# Patient Record
Sex: FEMALE | Race: WHITE | NOT HISPANIC OR LATINO | Employment: UNEMPLOYED | URBAN - METROPOLITAN AREA
[De-identification: names, ages, dates, MRNs, and addresses within clinical notes are randomized per-mention and may not be internally consistent; named-entity substitution may affect disease eponyms.]

---

## 2017-03-02 RX ORDER — IRBESARTAN 300 MG/1
TABLET ORAL
Qty: 90 TABLET | Refills: 0 | OUTPATIENT
Start: 2017-03-02

## 2017-11-15 ENCOUNTER — HOSPITAL ENCOUNTER (OUTPATIENT)
Facility: OTHER | Age: 82
Discharge: HOME | End: 2017-11-15
Attending: ANESTHESIOLOGY | Admitting: ANESTHESIOLOGY
Payer: MEDICARE

## 2017-11-15 VITALS
WEIGHT: 145 LBS | OXYGEN SATURATION: 95 % | HEART RATE: 71 BPM | RESPIRATION RATE: 18 BRPM | TEMPERATURE: 99 F | DIASTOLIC BLOOD PRESSURE: 70 MMHG | SYSTOLIC BLOOD PRESSURE: 151 MMHG | BODY MASS INDEX: 24.16 KG/M2 | HEIGHT: 65 IN

## 2017-11-15 DIAGNOSIS — M47.817 LUMBOSACRAL SPONDYLOSIS: ICD-10-CM

## 2017-11-15 DIAGNOSIS — M47.817 LUMBOSACRAL SPONDYLOSIS WITHOUT MYELOPATHY: Primary | ICD-10-CM

## 2017-11-15 PROCEDURE — 82947 ASSAY GLUCOSE BLOOD QUANT: CPT | Performed by: ANESTHESIOLOGY

## 2017-11-15 PROCEDURE — S0020 INJECTION, BUPIVICAINE HYDRO: HCPCS | Performed by: ANESTHESIOLOGY

## 2017-11-15 PROCEDURE — 64494 INJ PARAVERT F JNT L/S 2 LEV: CPT | Mod: 50 | Performed by: ANESTHESIOLOGY

## 2017-11-15 PROCEDURE — 64495 INJ PARAVERT F JNT L/S 3 LEV: CPT | Mod: 50 | Performed by: ANESTHESIOLOGY

## 2017-11-15 PROCEDURE — 25000003 PHARM REV CODE 250: Performed by: ANESTHESIOLOGY

## 2017-11-15 PROCEDURE — 64493 INJ PARAVERT F JNT L/S 1 LEV: CPT | Mod: 50 | Performed by: ANESTHESIOLOGY

## 2017-11-15 PROCEDURE — 63600175 PHARM REV CODE 636 W HCPCS: Performed by: ANESTHESIOLOGY

## 2017-11-15 RX ORDER — ALPRAZOLAM 0.5 MG/1
0.5 TABLET, ORALLY DISINTEGRATING ORAL ONCE AS NEEDED
Status: COMPLETED | OUTPATIENT
Start: 2017-11-15 | End: 2017-11-15

## 2017-11-15 RX ORDER — LIDOCAINE HYDROCHLORIDE 10 MG/ML
10 INJECTION INFILTRATION; PERINEURAL ONCE
Status: COMPLETED | OUTPATIENT
Start: 2017-11-15 | End: 2017-11-15

## 2017-11-15 RX ORDER — BETAMETHASONE SODIUM PHOSPHATE AND BETAMETHASONE ACETATE 3; 3 MG/ML; MG/ML
6 INJECTION, SUSPENSION INTRA-ARTICULAR; INTRALESIONAL; INTRAMUSCULAR; SOFT TISSUE
Status: COMPLETED | OUTPATIENT
Start: 2017-11-15 | End: 2017-11-15

## 2017-11-15 RX ORDER — BUPIVACAINE HYDROCHLORIDE 5 MG/ML
10 INJECTION, SOLUTION PERINEURAL ONCE
Status: COMPLETED | OUTPATIENT
Start: 2017-11-15 | End: 2017-11-15

## 2017-11-15 RX ADMIN — ALPRAZOLAM 0.5 MG: 0.5 TABLET, ORALLY DISINTEGRATING ORAL at 09:11

## 2017-11-15 NOTE — DISCHARGE INSTRUCTIONS

## 2017-11-15 NOTE — BRIEF OP NOTE
[facility]  Brief Operative Note     SUMMARY     Surgery Date: 11/15/2017     Surgeon(s) and Role:     * Kelby Shirley MD - Primary    Assisting Surgeon: None    Pre-op Diagnosis:  Lumbosacral spondylosis without myelopathy [M47.817]    Post-op Diagnosis:  Post-Op Diagnosis Codes:     * Lumbosacral spondylosis without myelopathy [M47.817]    Procedure(s) (LRB):  BLOCK-NERVE-MEDIAL BRANCH-LUMBAR (Bilateral)    Anesthesia: Local    Description of the findings of the procedure: fine    Findings/Key Components: fine    Estimated Blood Loss: * No values recorded between 11/15/2017  9:52 AM and 11/15/2017 10:08 AM *         Specimens:   Specimen (12h ago through future)    None          Discharge Note    SUMMARY     Admit Date: 11/15/2017    Discharge Date and Time:  11/15/2017 10:09 AM    Hospital Course (synopsis of major diagnoses, care, treatment, and services provided during the course of the hospital stay): fine     Final Diagnosis: Post-Op Diagnosis Codes:     * Lumbosacral spondylosis without myelopathy [M47.817]    Disposition: Home or Self Care    Follow Up/Patient Instructions:     Medications:  Reconciled Home Medications:   Current Discharge Medication List      CONTINUE these medications which have NOT CHANGED    Details   ACETAMINOPHEN (TYLENOL ARTHRITIS ORAL) Take 1 tablet by mouth as needed.       amlodipine (NORVASC) 10 MG tablet Take 1 tablet (10 mg total) by mouth once daily. 1 Tablet Oral Every day  Qty: 90 tablet, Refills: 3      aspirin 81 mg Tab Take 81 mg by mouth. 1 Tablet Oral Every day      blood sugar diagnostic (GLUCOSE BLOOD) Strp       BLOOD SUGAR DIAGNOSTIC, DISC (BREEZE 2 TEST STRIPS MISC) 1 na once  Every day.  And lancets      butalbital-acetaminophen-caff -40 mg Cap Take by mouth as needed. 1 Capsule Oral Every day      CLEVER CHOICE LEVEL 2 CONTROL Soln       ergocalciferol (ERGOCALCIFEROL) 50,000 unit Cap 1 Capsule Oral Monthly  Qty: 3 capsule,  Refills: 3      fenofibrate 160 MG Tab Take 1 tablet (160 mg total) by mouth once daily.  Qty: 90 tablet, Refills: 3      fluticasone (FLONASE) 50 mcg/actuation nasal spray 1 spray by Nasal route as needed. 2 Aerosol, Spray Each nostril Every day      FORA V12 BLOOD GLUCOSE SYSTEM Misc       hydrochlorothiazide (HYDRODIURIL) 12.5 MG Tab Half tablet at daily (please break for patient, thanks)  Qty: 15 tablet, Refills: 3    Associated Diagnoses: Hypertension      irbesartan (AVAPRO) 300 MG tablet TAKE 1 TABLET ONE TIME DAILY  Qty: 90 tablet, Refills: 0      lidocaine-prilocaine (EMLA) cream       LITE TOUCH LANCING DEVICE Misc       metoprolol tartrate (LOPRESSOR) 100 MG tablet 1/2 tablet twice daily  Qty: 90 tablet, Refills: 3      multivitamin-minerals-lutein (CENTRUM SILVER) Tab Take by mouth. 1 Tablet Oral Every day      omeprazole (PRILOSEC) 20 MG capsule Take 1 capsule (20 mg total) by mouth daily as needed.  Qty: 90 capsule, Refills: 3      ropinirole (REQUIP) 5 MG tablet Take 1 tablet (5 mg total) by mouth nightly. 1 Tablet Oral At bedtime  Qty: 90 tablet, Refills: 3      temazepam (RESTORIL) 15 mg Cap 1 Capsule Oral At bedtime  Qty: 30 capsule, Refills: 0    Associated Diagnoses: Insomnia      ULTRA THIN LANCETS 31 gauge Misc              Discharge Procedure Orders  Diet general     Activity as tolerated     Shower on day dressing removed (No bath)     Ice to affected area     Lifting restrictions     No dressing needed       Follow-up Information     Follow up In 1 week.

## 2017-11-16 NOTE — OP NOTE
DIAGNOSIS:  Lumbosacral spondylosis without myelopathy.    PROCEDURE:  Bilateral L4, L5, S-ala, S1 medial branch block with fluoroscopic   guidance.    HOSPITAL COURSE:  After the patient was examined and chart reviewed, she signed   informed consent and was taken to the Operative Suite.  In the prone position,   she was sterilely prepped and draped in the usual fashion.  Once this was done,   local anesthetic was injected over the target zones.  Then, using a 3.5-inch,   22-gauge spinal needle, after local anesthetic was injected with 1% lidocaine   placed at the junctions of the superior articular processes of L4, L5, S-ala and   S1 at approximately the 10 o'clock and 2 o'clock positions on each side of the   S1 neural foraminal canal.  Once this was done, a 1 mL solution containing 0.5%   bupivacaine with 1 mg of Celestone was injected through each needle.  Stylette   replaced and needles removed.  This was done bilaterally.  No apparent   subarachnoid or intravascular spread of solution.  No blood loss.  No specimens   taken.  The patient was discharged home in stable condition to be followed up in   1 week for quality and length of pain relief and possibly radiofrequency   ablation of the said nerves if she has a good outcome.      JESSENIA/HN  dd: 11/15/2017 10:39:14 (CST)  td: 11/16/2017 03:47:27 (CST)  Doc ID   #[id number]  Job ID #[id number]    CC:

## 2017-11-27 LAB — POCT GLUCOSE: 104 MG/DL (ref 70–110)

## 2019-09-20 ENCOUNTER — OFFICE VISIT (OUTPATIENT)
Dept: OPTOMETRY | Facility: CLINIC | Age: 84
End: 2019-09-20
Payer: COMMERCIAL

## 2019-09-20 DIAGNOSIS — E11.9 TYPE 2 DIABETES MELLITUS WITHOUT RETINOPATHY: Primary | ICD-10-CM

## 2019-09-20 DIAGNOSIS — Z96.1 PSEUDOPHAKIA OF BOTH EYES: ICD-10-CM

## 2019-09-20 DIAGNOSIS — H35.3131 EARLY DRY STAGE NONEXUDATIVE AGE-RELATED MACULAR DEGENERATION OF BOTH EYES: ICD-10-CM

## 2019-09-20 PROCEDURE — 99999 PR PBB SHADOW E&M-EST. PATIENT-LVL II: ICD-10-PCS | Mod: PBBFAC,,, | Performed by: OPTOMETRIST

## 2019-09-20 PROCEDURE — 92134 CPTRZ OPH DX IMG PST SGM RTA: CPT | Mod: S$GLB,,, | Performed by: OPTOMETRIST

## 2019-09-20 PROCEDURE — 92004 PR EYE EXAM, NEW PATIENT,COMPREHESV: ICD-10-PCS | Mod: S$GLB,,, | Performed by: OPTOMETRIST

## 2019-09-20 PROCEDURE — 99999 PR PBB SHADOW E&M-EST. PATIENT-LVL II: CPT | Mod: PBBFAC,,, | Performed by: OPTOMETRIST

## 2019-09-20 PROCEDURE — 92134 POSTERIOR SEGMENT OCT RETINA (OCULAR COHERENCE TOMOGRAPHY)-BOTH EYES: ICD-10-PCS | Mod: S$GLB,,, | Performed by: OPTOMETRIST

## 2019-09-20 PROCEDURE — 92004 COMPRE OPH EXAM NEW PT 1/>: CPT | Mod: S$GLB,,, | Performed by: OPTOMETRIST

## 2019-09-23 NOTE — PROGRESS NOTES
HPI     Here for diabetic eye exam  Blurred vision right eye x mos, constant, no relief over time, no   associated eye pain      Last edited by Uziel Kan, OD on 9/23/2019  8:51 AM. (History)            Assessment /Plan     For exam results, see Encounter Report.    Type 2 diabetes mellitus without retinopathy    Early dry stage nonexudative age-related macular degeneration of both eyes    Pseudophakia of both eyes      1. No diabetic retinopathy, possible csme. Return with [facility] for macula eval right eye.  2. Refer to [facility] for eval, OCT shows some possible fluid.   3. Monitor condition. Patient to report any changes. RTC 1 year recheck.

## 2019-10-24 ENCOUNTER — OFFICE VISIT (OUTPATIENT)
Dept: OPHTHALMOLOGY | Facility: CLINIC | Age: 84
End: 2019-10-24
Payer: MEDICARE

## 2019-10-24 DIAGNOSIS — H43.813 POSTERIOR VITREOUS DETACHMENT, BILATERAL: ICD-10-CM

## 2019-10-24 DIAGNOSIS — H35.3211 EXUDATIVE AGE-RELATED MACULAR DEGENERATION OF RIGHT EYE WITH ACTIVE CHOROIDAL NEOVASCULARIZATION: Primary | ICD-10-CM

## 2019-10-24 DIAGNOSIS — H35.3122 NONEXUDATIVE AGE-RELATED MACULAR DEGENERATION, LEFT EYE, INTERMEDIATE DRY STAGE: ICD-10-CM

## 2019-10-24 PROCEDURE — 92014 PR EYE EXAM, EST PATIENT,COMPREHESV: ICD-10-PCS | Mod: 25,S$GLB,, | Performed by: OPHTHALMOLOGY

## 2019-10-24 PROCEDURE — 92014 COMPRE OPH EXAM EST PT 1/>: CPT | Mod: 25,S$GLB,, | Performed by: OPHTHALMOLOGY

## 2019-10-24 PROCEDURE — 67028 INJECTION EYE DRUG: CPT | Mod: RT,S$GLB,, | Performed by: OPHTHALMOLOGY

## 2019-10-24 PROCEDURE — 99999 PR PBB SHADOW E&M-EST. PATIENT-LVL III: ICD-10-PCS | Mod: PBBFAC,,, | Performed by: OPHTHALMOLOGY

## 2019-10-24 PROCEDURE — 67028 PR INJECT INTRAVITREAL PHARMCOLOGIC: ICD-10-PCS | Mod: RT,S$GLB,, | Performed by: OPHTHALMOLOGY

## 2019-10-24 PROCEDURE — 99999 PR PBB SHADOW E&M-EST. PATIENT-LVL III: CPT | Mod: PBBFAC,,, | Performed by: OPHTHALMOLOGY

## 2019-10-24 RX ADMIN — Medication 1.25 MG: at 03:10

## 2019-10-24 NOTE — LETTER
October 24, 2019      Uziel Kan, OD  [address]  [place] [address]           [place] [facility]  [address]  [place] [address]  Phone: [phone]  Fax: [phone]          Patient: Heidi Alfonso   MR Number: [id number]   YOB: 1926   Date of Visit: 10/24/2019       Dear Dr. Uziel Kan:    Thank you for referring Heidi Alfonso to me for evaluation. Attached you will find relevant portions of my assessment and plan of care.    If you have questions, please do not hesitate to call me. I look forward to following Heidi Alfonso along with you.    Sincerely,    HENRY Cedeño MD    Enclosure  CC:  No Recipients    If you would like to receive this communication electronically, please contact [email or web address] or [phone] to request more information on [facility] access.    For providers and/or their staff who would like to refer a patient to [facility], please contact us through our one-stop-shop provider referral line, [facility], at [phone].    If you feel you have received this communication in error or would no longer like to receive these types of communications, please e-mail [email or web address]

## 2019-10-24 NOTE — PROGRESS NOTES
HPI     Diabetic Eye Exam      Additional comments: Retinal Eval- Dr. Kan              Comments       Type 2 diabetes mellitus without retinopathy  Early dry stage nonexudative age-related macular degeneration of both eyes  Pseudophakia of both eyes    No gtts          Last edited by Aron Michaud on 10/24/2019  2:05 PM. (History)          OCT - OD - SRF and IR fluid with drusen  OS - Drusen NO SRF      A/P    1. Wet AMD OD    Avastin OD today    2. Dry AMD OS  Can defer AREDS    3. HTN Ret OU  BP control    4. PVD OU    5. PCIOL OU      1 month OCT    Risks, benefits, and alternatives to treatment discussed in detail with the patient.  The patient voiced understanding and wished to proceed with the procedure    Injection Procedure Note:  Diagnosis: Wet AMD OD    Patient Identified and Time Out complete  Pt Prefers to be marked with sticker rather than ink marker (OHS.Qual.003 #5)  Topical Proparacaine and Betadine.  Inject Avastin OD at 6:00 @ 3.5-4mm posterior to limbus  Post Operative Dx: Same  Complications: None  Follow up as above.

## 2019-12-02 ENCOUNTER — CLINICAL SUPPORT (OUTPATIENT)
Dept: URGENT CARE | Facility: CLINIC | Age: 84
End: 2019-12-02
Payer: MEDICARE

## 2019-12-02 DIAGNOSIS — Z11.1 ENCOUNTER FOR PPD TEST: Primary | ICD-10-CM

## 2019-12-02 PROCEDURE — 86580 POCT TB SKIN TEST: ICD-10-PCS | Mod: S$GLB,,, | Performed by: NURSE PRACTITIONER

## 2019-12-02 PROCEDURE — 86580 TB INTRADERMAL TEST: CPT | Mod: S$GLB,,, | Performed by: NURSE PRACTITIONER

## 2019-12-05 ENCOUNTER — PROCEDURE VISIT (OUTPATIENT)
Dept: OPHTHALMOLOGY | Facility: CLINIC | Age: 84
End: 2019-12-05
Payer: MEDICARE

## 2019-12-05 VITALS — DIASTOLIC BLOOD PRESSURE: 63 MMHG | HEART RATE: 60 BPM | SYSTOLIC BLOOD PRESSURE: 152 MMHG

## 2019-12-05 DIAGNOSIS — H35.3211 EXUDATIVE AGE-RELATED MACULAR DEGENERATION OF RIGHT EYE WITH ACTIVE CHOROIDAL NEOVASCULARIZATION: Primary | ICD-10-CM

## 2019-12-05 DIAGNOSIS — H43.813 POSTERIOR VITREOUS DETACHMENT, BILATERAL: ICD-10-CM

## 2019-12-05 DIAGNOSIS — H35.3122 NONEXUDATIVE AGE-RELATED MACULAR DEGENERATION, LEFT EYE, INTERMEDIATE DRY STAGE: ICD-10-CM

## 2019-12-05 LAB
TB INDURATION - 48 HR READ: NORMAL
TB INDURATION - 72 HR READ: NORMAL
TB SKIN TEST - 48 HR READ: NORMAL
TB SKIN TEST - 72 HR READ: NEGATIVE

## 2019-12-05 PROCEDURE — 92014 PR EYE EXAM, EST PATIENT,COMPREHESV: ICD-10-PCS | Mod: 25,S$GLB,, | Performed by: OPHTHALMOLOGY

## 2019-12-05 PROCEDURE — 67028 PR INJECT INTRAVITREAL PHARMCOLOGIC: ICD-10-PCS | Mod: RT,S$GLB,, | Performed by: OPHTHALMOLOGY

## 2019-12-05 PROCEDURE — 67028 INJECTION EYE DRUG: CPT | Mod: RT,S$GLB,, | Performed by: OPHTHALMOLOGY

## 2019-12-05 PROCEDURE — 92134 POSTERIOR SEGMENT OCT RETINA (OCULAR COHERENCE TOMOGRAPHY)-BOTH EYES: ICD-10-PCS | Mod: S$GLB,,, | Performed by: OPHTHALMOLOGY

## 2019-12-05 PROCEDURE — 92134 CPTRZ OPH DX IMG PST SGM RTA: CPT | Mod: S$GLB,,, | Performed by: OPHTHALMOLOGY

## 2019-12-05 PROCEDURE — 92014 COMPRE OPH EXAM EST PT 1/>: CPT | Mod: 25,S$GLB,, | Performed by: OPHTHALMOLOGY

## 2019-12-05 RX ORDER — LISINOPRIL 10 MG/1
10 TABLET ORAL DAILY
Refills: 6 | Status: ON HOLD | COMMUNITY
Start: 2019-11-12 | End: 2020-01-04 | Stop reason: HOSPADM

## 2019-12-05 RX ORDER — APIXABAN 2.5 MG/1
2.5 TABLET, FILM COATED ORAL 2 TIMES DAILY
Refills: 6 | COMMUNITY
Start: 2019-11-12

## 2019-12-05 RX ADMIN — Medication 1.25 MG: at 04:12

## 2019-12-05 NOTE — PROGRESS NOTES
HPI     DLS: 10/24/2019 Dr. Cedeño    Patient states her vision has been stable since her last visit.     AT's PRN OU        OCT - OD - SRF and IR fluid improved with drusen  OS - Drusen NO SRF      A/P    1. Wet AMD OD  S/p Avastin OD x 1  Rapid response    Avastin OD today    2. Dry AMD OS  Can defer AREDS    3. HTN Ret OU  BP control    4. PVD OU    5. PCIOL OU      7-8 weeks OCT    Risks, benefits, and alternatives to treatment discussed in detail with the patient.  The patient voiced understanding and wished to proceed with the procedure    Injection Procedure Note:  Diagnosis: Wet AMD OD    Patient Identified and Time Out complete  Pt Prefers to be marked with sticker rather than ink marker (OHS.Qual.003 #5)  Topical Proparacaine and Betadine.  Inject Avastin OD at 6:00 @ 3.5-4mm posterior to limbus  Post Operative Dx: Same  Complications: None  Follow up as above.

## 2019-12-05 NOTE — PATIENT INSTRUCTIONS

## 2020-01-02 PROBLEM — J96.01 ACUTE HYPOXEMIC RESPIRATORY FAILURE: Status: ACTIVE | Noted: 2020-01-02

## 2020-01-02 PROBLEM — N39.0 URINARY TRACT INFECTION WITHOUT HEMATURIA: Status: ACTIVE | Noted: 2020-01-02

## 2020-01-02 PROBLEM — I50.33 ACUTE ON CHRONIC DIASTOLIC CONGESTIVE HEART FAILURE: Status: ACTIVE | Noted: 2020-01-02

## 2020-01-02 PROBLEM — N18.4 CHRONIC KIDNEY DISEASE, STAGE IV (SEVERE): Status: ACTIVE | Noted: 2020-01-02

## 2020-01-04 PROBLEM — I50.43 ACUTE ON CHRONIC COMBINED SYSTOLIC AND DIASTOLIC CONGESTIVE HEART FAILURE: Status: ACTIVE | Noted: 2020-01-02

## 2020-01-04 PROBLEM — N39.0 URINARY TRACT INFECTION WITHOUT HEMATURIA: Status: RESOLVED | Noted: 2020-01-02 | Resolved: 2020-01-04

## 2020-01-05 PROBLEM — J96.01 ACUTE HYPOXEMIC RESPIRATORY FAILURE: Status: RESOLVED | Noted: 2020-01-02 | Resolved: 2020-01-05

## 2020-01-07 ENCOUNTER — PATIENT OUTREACH (OUTPATIENT)
Dept: ADMINISTRATIVE | Facility: CLINIC | Age: 85
End: 2020-01-07

## 2020-01-23 PROBLEM — N18.9 CHRONIC KIDNEY DISEASE-MINERAL AND BONE DISORDER: Status: ACTIVE | Noted: 2020-01-23

## 2020-01-23 PROBLEM — N18.4 ANEMIA OF CHRONIC KIDNEY FAILURE, STAGE 4 (SEVERE): Status: ACTIVE | Noted: 2020-01-23

## 2020-01-23 PROBLEM — E83.9 CHRONIC KIDNEY DISEASE-MINERAL AND BONE DISORDER: Status: ACTIVE | Noted: 2020-01-23

## 2020-01-23 PROBLEM — D63.1 ANEMIA OF CHRONIC KIDNEY FAILURE, STAGE 4 (SEVERE): Status: ACTIVE | Noted: 2020-01-23

## 2020-01-23 PROBLEM — M89.9 CHRONIC KIDNEY DISEASE-MINERAL AND BONE DISORDER: Status: ACTIVE | Noted: 2020-01-23

## 2020-01-24 ENCOUNTER — OFFICE VISIT (OUTPATIENT)
Dept: FAMILY MEDICINE | Facility: CLINIC | Age: 85
End: 2020-01-24
Payer: MEDICARE

## 2020-01-24 VITALS
TEMPERATURE: 98 F | HEART RATE: 62 BPM | BODY MASS INDEX: 19.97 KG/M2 | DIASTOLIC BLOOD PRESSURE: 78 MMHG | HEIGHT: 65 IN | SYSTOLIC BLOOD PRESSURE: 162 MMHG | OXYGEN SATURATION: 96 %

## 2020-01-24 DIAGNOSIS — N18.9 CHRONIC KIDNEY DISEASE-MINERAL AND BONE DISORDER: ICD-10-CM

## 2020-01-24 DIAGNOSIS — H35.3211 EXUDATIVE AGE-RELATED MACULAR DEGENERATION OF RIGHT EYE WITH ACTIVE CHOROIDAL NEOVASCULARIZATION: ICD-10-CM

## 2020-01-24 DIAGNOSIS — G47.00 INSOMNIA, UNSPECIFIED TYPE: ICD-10-CM

## 2020-01-24 DIAGNOSIS — R19.7 DIARRHEA, UNSPECIFIED TYPE: ICD-10-CM

## 2020-01-24 DIAGNOSIS — I71.20 THORACIC AORTIC ANEURYSM WITHOUT RUPTURE: ICD-10-CM

## 2020-01-24 DIAGNOSIS — N25.81 SECONDARY RENAL HYPERPARATHYROIDISM: ICD-10-CM

## 2020-01-24 DIAGNOSIS — R73.09 ABNORMAL GLUCOSE: ICD-10-CM

## 2020-01-24 DIAGNOSIS — G20.A1 PARKINSON'S DISEASE: ICD-10-CM

## 2020-01-24 DIAGNOSIS — M89.9 CHRONIC KIDNEY DISEASE-MINERAL AND BONE DISORDER: ICD-10-CM

## 2020-01-24 DIAGNOSIS — D64.9 ANEMIA, UNSPECIFIED TYPE: ICD-10-CM

## 2020-01-24 DIAGNOSIS — E11.9 TYPE 2 DIABETES MELLITUS WITHOUT RETINOPATHY: ICD-10-CM

## 2020-01-24 DIAGNOSIS — N18.4 CHRONIC KIDNEY DISEASE, STAGE IV (SEVERE): ICD-10-CM

## 2020-01-24 DIAGNOSIS — E78.5 HYPERLIPIDEMIA, UNSPECIFIED HYPERLIPIDEMIA TYPE: Primary | ICD-10-CM

## 2020-01-24 DIAGNOSIS — I10 ESSENTIAL HYPERTENSION: ICD-10-CM

## 2020-01-24 DIAGNOSIS — E83.9 CHRONIC KIDNEY DISEASE-MINERAL AND BONE DISORDER: ICD-10-CM

## 2020-01-24 PROCEDURE — 1125F PR PAIN SEVERITY QUANTIFIED, PAIN PRESENT: ICD-10-PCS | Mod: S$GLB,,, | Performed by: INTERNAL MEDICINE

## 2020-01-24 PROCEDURE — 1101F PR PT FALLS ASSESS DOC 0-1 FALLS W/OUT INJ PAST YR: ICD-10-PCS | Mod: CPTII,S$GLB,, | Performed by: INTERNAL MEDICINE

## 2020-01-24 PROCEDURE — 99999 PR PBB SHADOW E&M-EST. PATIENT-LVL IV: CPT | Mod: PBBFAC,,, | Performed by: INTERNAL MEDICINE

## 2020-01-24 PROCEDURE — 99215 PR OFFICE/OUTPT VISIT, EST, LEVL V, 40-54 MIN: ICD-10-PCS | Mod: S$GLB,,, | Performed by: INTERNAL MEDICINE

## 2020-01-24 PROCEDURE — 99999 PR PBB SHADOW E&M-EST. PATIENT-LVL IV: ICD-10-PCS | Mod: PBBFAC,,, | Performed by: INTERNAL MEDICINE

## 2020-01-24 PROCEDURE — 99215 OFFICE O/P EST HI 40 MIN: CPT | Mod: S$GLB,,, | Performed by: INTERNAL MEDICINE

## 2020-01-24 PROCEDURE — 1159F MED LIST DOCD IN RCRD: CPT | Mod: S$GLB,,, | Performed by: INTERNAL MEDICINE

## 2020-01-24 PROCEDURE — 1159F PR MEDICATION LIST DOCUMENTED IN MEDICAL RECORD: ICD-10-PCS | Mod: S$GLB,,, | Performed by: INTERNAL MEDICINE

## 2020-01-24 PROCEDURE — 1101F PT FALLS ASSESS-DOCD LE1/YR: CPT | Mod: CPTII,S$GLB,, | Performed by: INTERNAL MEDICINE

## 2020-01-24 PROCEDURE — 1125F AMNT PAIN NOTED PAIN PRSNT: CPT | Mod: S$GLB,,, | Performed by: INTERNAL MEDICINE

## 2020-01-24 RX ORDER — MIRTAZAPINE 15 MG/1
15 TABLET, FILM COATED ORAL NIGHTLY
Qty: 30 TABLET | Refills: 3 | Status: SHIPPED | OUTPATIENT
Start: 2020-01-24 | End: 2021-01-23

## 2020-01-24 RX ORDER — AMLODIPINE BESYLATE 5 MG/1
TABLET ORAL
COMMUNITY
Start: 2020-01-02 | End: 2020-01-24 | Stop reason: DRUGHIGH

## 2020-01-24 RX ORDER — AMLODIPINE BESYLATE 10 MG/1
10 TABLET ORAL DAILY
Qty: 90 TABLET | Refills: 3
Start: 2020-01-24

## 2020-01-24 RX ORDER — METOPROLOL TARTRATE 100 MG/1
TABLET ORAL
COMMUNITY
Start: 2020-01-16 | End: 2020-01-24 | Stop reason: SDUPTHER

## 2020-01-24 NOTE — PROGRESS NOTES
NEW PATIENT VISIT INTERNAL MEDICINE    Patient Active Problem List   Diagnosis    Parkinson disease    Thoracic aortic aneurysm without mention of rupture    Proteinuria    Secondary hyperparathyroidism (of renal origin)    Hypertension    Diabetes mellitus, type 2    Hyperlipidemia    Migraine headache    GERD (gastroesophageal reflux disease)    Benign hypertensive renal disease without renal failure    Macular degeneration - Both Eyes    Orthostatic hypotension    Anemia    Aortic stenosis    Lumbosacral spondylosis    Exudative age-related macular degeneration of right eye with active choroidal neovascularization    Nonexudative age-related macular degeneration, left eye, intermediate dry stage    Posterior vitreous detachment, bilateral    PAF (paroxysmal atrial fibrillation)    Acute on chronic combined systolic and diastolic congestive heart failure    Chronic kidney disease, stage IV (severe)    Anemia of chronic kidney failure, stage 4 (severe)    Chronic kidney disease-mineral and bone disorder       CC:   Chief Complaint   Patient presents with    Hospital Follow Up    Establish Care       HPI: Heidi Alfonso   is a 89 y.o. female   I have reviewed the PMH, SH and FH for this patient    She  has a past medical history of Cataract, Diabetes mellitus, Diabetes mellitus type II, GERD (gastroesophageal reflux disease), History of hypotension, Hyperlipidemia, Hypertension, Lumbosacral spondylosis (11/15/2017), Macular degeneration - Both Eyes (6/24/2013), Migraine headache, Migraines, neuralgic, PAF (paroxysmal atrial fibrillation), Parkinson disease, Renal insufficiency, and Thoracic aortic aneurysm without mention of rupture.     The patient presents today for follow-up of chronic conditions and to establish care. She is accompanied by her daughter who assists with history. She was recently in the hospital for kidney and heart failure. She has only one functioning kidney because  she had a reaction to contrast in the past. Her kidney doctor has ordered labs to be done in about 3 weeks. Her blood pressure is high at 162/78. She is moving at [facility] now. She has been taking a lot of controlled drugs. I recommend stopping the fioricet. It is not good for people to take. She has been taking temazepam. I have recommended that she try mirtazapine instead  If she needs something for sleep. She has seen a pain doctor in the past. She has a prescription for tramadol. I do not recommend taking tramadol because of fall risks. She can take tylenol if needed for pain. She has also been taking lomotil for diarrhea. I have suggested that we stop omeprazole because it can make diarrhea worse. We can add metamucil to help with diarrhea also. Her blood sugars were high on last labs. She has had a few Hgba1c ordered, but none have been completed. We will reorder this.         ROS: Review of Systems   Constitutional: Negative for chills, fatigue and fever.   HENT: Negative for congestion, ear discharge, ear pain, rhinorrhea and sore throat.    Eyes: Negative for discharge, redness and itching.   Respiratory: Negative for cough, chest tightness, shortness of breath and wheezing.    Cardiovascular: Negative for chest pain, palpitations and leg swelling.   Gastrointestinal: Positive for diarrhea. Negative for abdominal pain, constipation, nausea and vomiting.   Endocrine: Negative for cold intolerance and heat intolerance.   Genitourinary: Negative for dysuria, flank pain, frequency and hematuria.   Musculoskeletal: Negative for arthralgias, back pain, joint swelling and myalgias.   Skin: Negative for color change and rash.   Neurological: Negative for dizziness, tremors, numbness and headaches.   Psychiatric/Behavioral: Negative for dysphoric mood and sleep disturbance. The patient is not nervous/anxious.        PMHX:   Past Medical History:   Diagnosis Date    Cataract     Diabetes mellitus     Diabetes  mellitus type II     GERD (gastroesophageal reflux disease)     History of hypotension     Hyperlipidemia     Hypertension     Lumbosacral spondylosis 11/15/2017    Macular degeneration - Both Eyes 6/24/2013    Migraine headache     Migraines, neuralgic     PAF (paroxysmal atrial fibrillation)     Parkinson disease     Renal insufficiency     Thoracic aortic aneurysm without mention of rupture        PSHX:   Past Surgical History:   Procedure Laterality Date    APPENDECTOMY      CATARACT EXTRACTION      GALLBLADDER SURGERY  2011    HYSTERECTOMY         FAMHX:   Family History   Problem Relation Age of Onset    Heart disease Father     Amblyopia Neg Hx     Blindness Neg Hx     Cataracts Neg Hx     Glaucoma Neg Hx     Retinal detachment Neg Hx     Macular degeneration Neg Hx     Strabismus Neg Hx     Melanoma Neg Hx     Psoriasis Neg Hx     Lupus Neg Hx     Eczema Neg Hx     Acne Neg Hx        SOCHX:   Social History     Socioeconomic History    Marital status:      Spouse name: Not on file    Number of children: Not on file    Years of education: Not on file    Highest education level: Not on file   Occupational History    Occupation: Housewife   Social Needs    Financial resource strain: Not on file    Food insecurity:     Worry: Not on file     Inability: Not on file    Transportation needs:     Medical: Not on file     Non-medical: Not on file   Tobacco Use    Smoking status: Former Smoker     Packs/day: 0.50     Types: Cigarettes    Smokeless tobacco: Former User     Quit date: 6/4/1994   Substance and Sexual Activity    Alcohol use: No    Drug use: No    Sexual activity: Not Currently   Lifestyle    Physical activity:     Days per week: Not on file     Minutes per session: Not on file    Stress: Not on file   Relationships    Social connections:     Talks on phone: Not on file     Gets together: Not on file     Attends [employer] service: Not on file     Active  "member of club or organization: Not on file     Attends meetings of clubs or organizations: Not on file     Relationship status: Not on file   Other Topics Concern    Are you pregnant or think you may be? No    Breast-feeding No   Social History Narrative    Not on file       ALLERGIES:   Review of patient's allergies indicates:   Allergen Reactions    Sulfa (sulfonamide antibiotics) Other (See Comments)    Sulfadiazine      Other reaction(s): Unknown       MEDS:   Current Outpatient Medications:     amLODIPine (NORVASC) 10 MG tablet, Take 1 tablet (10 mg total) by mouth once daily. 1 Tablet Oral Every day, Disp: 90 tablet, Rfl: 3    ELIQUIS 2.5 mg Tab, Take 2.5 mg by mouth 2 (two) times daily., Disp: , Rfl: 6    furosemide (LASIX) 40 MG tablet, Take 1 tablet (40 mg total) by mouth once daily., Disp: 30 tablet, Rfl: 0    loperamide (IMODIUM) 2 mg capsule, Take 2 mg by mouth 4 (four) times daily as needed for Diarrhea., Disp: , Rfl:     melatonin 5 mg Tab, Take 5 mg by mouth nightly. Take one tablet by mouth at bedtime, Disp: , Rfl:     metoprolol succinate (TOPROL-XL) 100 MG 24 hr tablet, Take 100 mg by mouth 2 (two) times daily., Disp: , Rfl:     psyllium (METAMUCIL) packet, Take 1 packet by mouth once daily., Disp: , Rfl:     traMADol (ULTRAM) 50 mg tablet, Take 50 mg by mouth every 6 (six) hours as needed for Pain., Disp: , Rfl:     aspirin 81 mg Tab, Take 81 mg by mouth. 1 Tablet Oral Every day, Disp: , Rfl:     mirtazapine (REMERON) 15 MG tablet, Take 1 tablet (15 mg total) by mouth every evening., Disp: 30 tablet, Rfl: 3    OBJECTIVE:   Vitals:    01/24/20 1416   BP: (!) 162/78   BP Location: Left arm   Patient Position: Sitting   BP Method: Medium (Manual)   Pulse: 62   Temp: 97.6 °F (36.4 °C)   TempSrc: Oral   SpO2: 96%   Weight: Comment: pt refused she stated she weighed 120lbs yesterday   Height: 5' 5" (1.651 m)     Body mass index is 19.97 kg/m².    Physical Exam   Constitutional: She is " oriented to person, place, and time. She appears well-developed and well-nourished. She does not have a sickly appearance. No distress.   HENT:   Head: Normocephalic and atraumatic. Hair is normal.   Right Ear: Hearing and external ear normal. Tympanic membrane is not erythematous, not retracted and not bulging. No middle ear effusion.   Left Ear: Hearing and external ear normal. Tympanic membrane is not erythematous, not retracted and not bulging.  No middle ear effusion.   Nose: Nose normal. No rhinorrhea or sinus tenderness. Right sinus exhibits no maxillary sinus tenderness and no frontal sinus tenderness. Left sinus exhibits no frontal sinus tenderness.   Mouth/Throat: Oropharynx is clear and moist and mucous membranes are normal. No oropharyngeal exudate or posterior oropharyngeal erythema. No tonsillar exudate.   Eyes: Pupils are equal, round, and reactive to light. Conjunctivae, EOM and lids are normal. Right eye exhibits no discharge. Left eye exhibits no discharge. Right conjunctiva is not injected. Left conjunctiva is not injected. No scleral icterus.   Neck: Normal range of motion. Neck supple. No JVD present. No tracheal tenderness present. Carotid bruit is not present. No neck rigidity. No tracheal deviation present. No thyroid mass and no thyromegaly present.   Cardiovascular: Normal rate, regular rhythm, normal heart sounds and intact distal pulses. PMI is not displaced. Exam reveals no gallop and no friction rub.   No murmur heard.  Pulmonary/Chest: Effort normal and breath sounds normal. No tachypnea. No respiratory distress. She has no decreased breath sounds. She has no wheezes. She has no rhonchi. She has no rales. She exhibits no tenderness.   Abdominal: Soft. Bowel sounds are normal. She exhibits no distension and no mass. There is no hepatosplenomegaly. There is no tenderness. There is no rigidity, no rebound, no guarding and no CVA tenderness. No hernia.   Musculoskeletal: Normal range of  motion. She exhibits no edema or tenderness.   Lymphadenopathy:     She has no cervical adenopathy.   Neurological: She is alert and oriented to person, place, and time. She displays no atrophy, no tremor and normal reflexes. No cranial nerve deficit or sensory deficit. Gait normal.   Skin: Skin is warm and dry. No bruising and no rash noted. She is not diaphoretic. No cyanosis or erythema. No pallor. Nails show no clubbing.   Psychiatric: She has a normal mood and affect. Her speech is normal and behavior is normal. Judgment normal. Her mood appears not anxious. She is not agitated and not aggressive. She does not exhibit a depressed mood.   Nursing note and vitals reviewed.        Depression Patient Health Questionnaire 1/24/2020   Over the last two weeks how often have you been bothered by little interest or pleasure in doing things 0   Over the last two weeks how often have you been bothered by feeling down, depressed or hopeless 0   PHQ-2 Total Score 0       PERTINENT RESULTS:   None    ASSESSMENT:  Problem List Items Addressed This Visit        Ophtho    Macular degeneration - Both Eyes - follow-up with ophthalmology.        Cardiac/Vascular    Thoracic aortic aneurysm without mention of rupture - follow-up with cardiology    Overview     Dx updated per 2019 IMO Load         Hypertension - She is on metoprolol. BP is too high.       Hyperlipidemia - Primary - She has higher cholesterol than is recommended with heart disease. She has been intolerant of statins.        Renal/    Chronic kidney disease, stage IV (severe) - follow-up with kidney doctor.       Chronic kidney disease-mineral and bone disorder - stable. Follow-up with kidney doctor.        Oncology    Anemia - labs being rechecked.        Endocrine    Secondary hyperparathyroidism (of renal origin) - stable.     Overview     Dx updated per 2019 IMO Load           Other Visit Diagnoses     Type 2 diabetes mellitus without retinopathy     - HgbA1c  needs to be checked.       Parkinson's disease     - follow-up with neurology.      Insomnia, unspecified type     - try mirtazapine instead of temazepam.       Abnormal glucose     - check hgbA1c.     Relevant Orders    Hemoglobin A1c    Diarrhea, unspecified type     - using lomotil. I have stopped omeprazole. Try metamucil if needed.           PLAN:   Orders Placed This Encounter    Hemoglobin A1c    amLODIPine (NORVASC) 10 MG tablet    mirtazapine (REMERON) 15 MG tablet           Follow-up with me in 3 months.       Marquis Barillas MD, FACP  Internal Medicine

## 2020-01-24 NOTE — PATIENT INSTRUCTIONS
We have reviewed your prescription medications.   I have sent her mirtazapine instead of temazepam for sleep.  I do not recommend taking xanax or lorazepam.   I do not recommend the tramadol. If she needs that, it will have to come from a pain doctor.   I have also stopped her omeprazole because it can cause diarrhea. Continue metamucil.  We can try questran instead of metamucil if her diarrhea persists.   I also stopped the fioricet.  Take tylenol for headaches.   We will add a hgba1c to her labs ordered by kidney doctor.   Follow-up with me in about 3 months.

## 2020-01-29 NOTE — PROGRESS NOTES
Patient, Heidi Alfonso (MRN #[id number]), presented with a recent estimated Glumerular Filtration Rate (eGFR) less than 15 consistent with the definition of chronic kidney disease stage 5 (ICD10 - N18.5).    eGFR if non    Date Value Ref Range Status   01/20/2020 13.3 (A) >60 mL/min/1.73 m^2 Final     Comment:     Calculation used to obtain the estimated glomerular filtration  rate (eGFR) is the CKD-EPI equation.          The patient's chronic kidney disease stage 5 was monitored, evaluated, addressed and/or treated. This addendum to the medical record is made on 01/29/2020.

## 2020-01-30 ENCOUNTER — PROCEDURE VISIT (OUTPATIENT)
Dept: OPHTHALMOLOGY | Facility: CLINIC | Age: 85
End: 2020-01-30
Payer: MEDICARE

## 2020-01-30 VITALS — SYSTOLIC BLOOD PRESSURE: 144 MMHG | DIASTOLIC BLOOD PRESSURE: 56 MMHG | HEART RATE: 65 BPM

## 2020-01-30 DIAGNOSIS — H35.3211 EXUDATIVE AGE-RELATED MACULAR DEGENERATION OF RIGHT EYE WITH ACTIVE CHOROIDAL NEOVASCULARIZATION: Primary | ICD-10-CM

## 2020-01-30 DIAGNOSIS — H35.3122 NONEXUDATIVE AGE-RELATED MACULAR DEGENERATION, LEFT EYE, INTERMEDIATE DRY STAGE: ICD-10-CM

## 2020-01-30 DIAGNOSIS — H43.813 POSTERIOR VITREOUS DETACHMENT, BILATERAL: ICD-10-CM

## 2020-01-30 PROCEDURE — 92134 CPTRZ OPH DX IMG PST SGM RTA: CPT | Mod: S$GLB,,, | Performed by: OPHTHALMOLOGY

## 2020-01-30 PROCEDURE — 67028 PR INJECT INTRAVITREAL PHARMCOLOGIC: ICD-10-PCS | Mod: RT,S$GLB,, | Performed by: OPHTHALMOLOGY

## 2020-01-30 PROCEDURE — 92014 PR EYE EXAM, EST PATIENT,COMPREHESV: ICD-10-PCS | Mod: 25,S$GLB,, | Performed by: OPHTHALMOLOGY

## 2020-01-30 PROCEDURE — 92134 POSTERIOR SEGMENT OCT RETINA (OCULAR COHERENCE TOMOGRAPHY)-BOTH EYES: ICD-10-PCS | Mod: S$GLB,,, | Performed by: OPHTHALMOLOGY

## 2020-01-30 PROCEDURE — 92014 COMPRE OPH EXAM EST PT 1/>: CPT | Mod: 25,S$GLB,, | Performed by: OPHTHALMOLOGY

## 2020-01-30 PROCEDURE — 67028 INJECTION EYE DRUG: CPT | Mod: RT,S$GLB,, | Performed by: OPHTHALMOLOGY

## 2020-01-30 RX ADMIN — Medication 1.25 MG: at 04:01

## 2020-01-30 NOTE — PATIENT INSTRUCTIONS

## 2020-01-30 NOTE — PROGRESS NOTES
HPI     DLS: 12/05/2019 Dr. Cedeño    Patient states her vision has been stable since her last visit. Denies   pain.     AT's PRN OU      OCT - OD - SRF and IR fluid improved with drusen  OS - Drusen NO SRF      A/P    1. Wet AMD OD  S/p Avastin OD x 2  Rapid response  Continue extension    Avastin OD today    2. Dry AMD OS  Can defer AREDS    3. HTN Ret OU  BP control    4. PVD OU    5. PCIOL OU      12  weeks OCT    Risks, benefits, and alternatives to treatment discussed in detail with the patient.  The patient voiced understanding and wished to proceed with the procedure    Injection Procedure Note:  Diagnosis: Wet AMD OD    Patient Identified and Time Out complete  Pt Prefers to be marked with sticker rather than ink marker (OHS.Qual.003 #5)  Topical Proparacaine and Betadine.  Inject Avastin OD at 6:00 @ 3.5-4mm posterior to limbus  Post Operative Dx: Same  Complications: None  Follow up as above.

## 2020-01-31 ENCOUNTER — TELEPHONE (OUTPATIENT)
Dept: OPTOMETRY | Facility: CLINIC | Age: 85
End: 2020-01-31

## 2020-03-06 PROBLEM — G93.41 ACUTE METABOLIC ENCEPHALOPATHY: Status: ACTIVE | Noted: 2020-03-06

## 2020-03-06 PROBLEM — R79.89 ELEVATED TROPONIN: Status: ACTIVE | Noted: 2020-03-06

## 2020-03-06 PROBLEM — I50.9 CHRONIC CONGESTIVE HEART FAILURE: Status: ACTIVE | Noted: 2020-03-06

## 2020-03-06 PROBLEM — I50.9 CHRONIC CONGESTIVE HEART FAILURE: Status: RESOLVED | Noted: 2020-03-06 | Resolved: 2020-03-06

## 2020-03-09 PROBLEM — G93.41 ACUTE METABOLIC ENCEPHALOPATHY: Status: RESOLVED | Noted: 2020-03-06 | Resolved: 2020-03-09

## 2020-03-20 ENCOUNTER — TELEPHONE (OUTPATIENT)
Dept: INTERNAL MEDICINE | Facility: CLINIC | Age: 85
End: 2020-03-20

## 2020-03-20 RX ORDER — TRAMADOL HYDROCHLORIDE 50 MG/1
50 TABLET ORAL EVERY 12 HOURS PRN
Qty: 60 TABLET | Refills: 0 | Status: SHIPPED | OUTPATIENT
Start: 2020-03-20

## 2020-03-20 NOTE — TELEPHONE ENCOUNTER
I have placed the requested orders. Please inform them. Only one month sent as this is in violation of the state law prohibiting use of benzodiazepines and narcotics in the same patient.

## 2020-03-20 NOTE — TELEPHONE ENCOUNTER
----- Message from Heather White sent at 3/20/2020 12:21 PM CDT -----  Contact: Nathan, daughter, [phone]  States patient is at [facility] and has severe lower back pain and it trying to get someone to give her something for it. States patient is 89 and her family wants her comfortable and you don't have to worry about her falling down because she's in a wheechair and has assistance. Asks that you call [facility]. Please advise.

## 2020-03-20 NOTE — TELEPHONE ENCOUNTER
Spoke with Caroline Lund she informed me that the patient is taking tylenol and also tramadol when needed for her pain. Yesterday she complained of being in a lot of pain. The patients family would like for you to increase her dosage of Tramadol stating she can take medication twice a day instead of once, or would like for you to give her something else she can take. Please advise thanks.

## 2020-03-21 PROBLEM — I50.9 ACUTE ON CHRONIC CONGESTIVE HEART FAILURE: Status: ACTIVE | Noted: 2020-03-21

## 2020-03-23 PROBLEM — Z71.89 COUNSELING REGARDING END OF LIFE DECISION MAKING: Status: ACTIVE | Noted: 2020-03-23

## (undated) DEVICE — DRESSING LEUKOPLAST FLEX 1X3IN